# Patient Record
Sex: FEMALE | Race: WHITE | Employment: FULL TIME | URBAN - METROPOLITAN AREA
[De-identification: names, ages, dates, MRNs, and addresses within clinical notes are randomized per-mention and may not be internally consistent; named-entity substitution may affect disease eponyms.]

---

## 2021-01-18 ENCOUNTER — OFFICE VISIT (OUTPATIENT)
Dept: FAMILY MEDICINE CLINIC | Age: 31
End: 2021-01-18
Payer: COMMERCIAL

## 2021-01-18 VITALS
BODY MASS INDEX: 41.32 KG/M2 | HEART RATE: 92 BPM | WEIGHT: 242 LBS | OXYGEN SATURATION: 98 % | TEMPERATURE: 98.2 F | DIASTOLIC BLOOD PRESSURE: 86 MMHG | SYSTOLIC BLOOD PRESSURE: 114 MMHG | HEIGHT: 64 IN

## 2021-01-18 DIAGNOSIS — G44.021 INTRACTABLE CHRONIC CLUSTER HEADACHE: ICD-10-CM

## 2021-01-18 DIAGNOSIS — J45.20 MILD INTERMITTENT ASTHMA WITHOUT COMPLICATION: ICD-10-CM

## 2021-01-18 DIAGNOSIS — Z00.00 ENCOUNTER FOR SCREENING AND PREVENTATIVE CARE: Primary | ICD-10-CM

## 2021-01-18 PROBLEM — G44.029 CHRONIC CLUSTER HEADACHE: Status: ACTIVE | Noted: 2021-01-18

## 2021-01-18 PROCEDURE — 99385 PREV VISIT NEW AGE 18-39: CPT | Performed by: NURSE PRACTITIONER

## 2021-01-18 RX ORDER — VERAPAMIL HYDROCHLORIDE 240 MG/1
480 TABLET, FILM COATED, EXTENDED RELEASE ORAL DAILY
COMMUNITY
Start: 2021-01-09 | End: 2021-03-22 | Stop reason: SDUPTHER

## 2021-01-18 RX ORDER — DESOGESTREL AND ETHINYL ESTRADIOL 7 DAYS X 3
1 KIT ORAL DAILY
COMMUNITY
End: 2021-03-22 | Stop reason: SDUPTHER

## 2021-01-18 SDOH — ECONOMIC STABILITY: TRANSPORTATION INSECURITY
IN THE PAST 12 MONTHS, HAS LACK OF TRANSPORTATION KEPT YOU FROM MEETINGS, WORK, OR FROM GETTING THINGS NEEDED FOR DAILY LIVING?: NO

## 2021-01-18 SDOH — ECONOMIC STABILITY: TRANSPORTATION INSECURITY
IN THE PAST 12 MONTHS, HAS THE LACK OF TRANSPORTATION KEPT YOU FROM MEDICAL APPOINTMENTS OR FROM GETTING MEDICATIONS?: NO

## 2021-01-18 SDOH — ECONOMIC STABILITY: FOOD INSECURITY: WITHIN THE PAST 12 MONTHS, YOU WORRIED THAT YOUR FOOD WOULD RUN OUT BEFORE YOU GOT MONEY TO BUY MORE.: NEVER TRUE

## 2021-01-18 ASSESSMENT — ENCOUNTER SYMPTOMS
ABDOMINAL DISTENTION: 0
RHINORRHEA: 0
VOMITING: 0
DIARRHEA: 0
SHORTNESS OF BREATH: 0
COUGH: 0
ABDOMINAL PAIN: 0
CONSTIPATION: 0
BACK PAIN: 0
CHEST TIGHTNESS: 0
NAUSEA: 0
SORE THROAT: 0

## 2021-01-18 ASSESSMENT — PATIENT HEALTH QUESTIONNAIRE - PHQ9
SUM OF ALL RESPONSES TO PHQ QUESTIONS 1-9: 0
1. LITTLE INTEREST OR PLEASURE IN DOING THINGS: 0
SUM OF ALL RESPONSES TO PHQ QUESTIONS 1-9: 0
SUM OF ALL RESPONSES TO PHQ QUESTIONS 1-9: 0
SUM OF ALL RESPONSES TO PHQ9 QUESTIONS 1 & 2: 0

## 2021-01-18 NOTE — PROGRESS NOTES
Visit Information    Have you changed or started any medications since your last visit including any over-the-counter medicines, vitamins, or herbal medicines? no   Are you having any side effects from any of your medications? -  no  Have you stopped taking any of your medications? Is so, why? -  no    Have you seen any other physician or provider since your last visit? No  Have you had any other diagnostic tests since your last visit? No  Have you been seen in the emergency room and/or had an admission to a hospital since we last saw you? No  Have you had your routine dental cleaning in the past 6 months? no    Have you activated your [employer] account? If not, what are your barriers?  Yes     Patient Care Team:  TRACY Veliz NP as PCP - General (Nurse Practitioner)    Medical History Review  Past Medical, Family, and Social History reviewed and does not contribute to the patient presenting condition    Health Maintenance   Topic Date Due    DTaP/Tdap/Td vaccine (1 - Tdap) 01/31/2010    Cervical cancer screen  01/31/2012    Flu vaccine (1) 01/18/2022 (Originally 9/1/2020)    Hepatitis C screen  01/18/2022 (Originally 1990)    HIV screen  01/18/2022 (Originally 1/31/2006)    Hepatitis A vaccine  Aged Out    Hepatitis B vaccine  Aged Out    Hib vaccine  Aged Out    Meningococcal (ACWY) vaccine  Aged Out    Pneumococcal 0-64 years Vaccine  Aged Out    Varicella vaccine  Discontinued

## 2021-01-18 NOTE — PROGRESS NOTES
Oli Coleman, APRN-CNP  [facility]  [id number] [address] , [address]  [address] [address]  Dept: [phone]  Dept Fax: [phone]    Patient ID: Lisette iNx is a 35 y.o. female. HPI    Lisette Nix is a 35 y.o. female who presents to the office today for a first visit and to establish a relationship with a new primary care physician. Today, the patient complains is in need of a new PCP. She and her sister did just recently move here from [facility] due to a job change. Pt denies any fever or chills. Pt today denies any HA, chest pain, or SOB. Pt denies any N/V/D/C or abdominal pain. She does have a history of asthma, which she states is well controlled and a history of migraines. She has been taking verapamil for years for her migraines and relates that they are stable. The patient's past medical, surgical, social, and family history as well as her current medications and allergies were reviewed as documented in today's encounter by KAUR Bah. Other notes reviewed prior to and during encounter include:  None available      There are no previous labs to review.  Preventative Care thus far include:   o Colonoscopy: N/A  o Mammogram: N/A   o Gynecological: N/A    o Smoking history: Denies  o Alcohol consumption: Denies    o Drug use: Denies      Previous primary care provider: no   Date last evaluated by former primary care provider: last year   Need to request previous records: no     Current Outpatient Medications on File Prior to Visit   Medication Sig Dispense Refill    desogestrel-ethinyl estradiol (VELIVET) 0.1/0.125/0.15 -0.025 MG tablet Take 1 tablet by mouth daily      Multiple Vitamins-Minerals (MULTIVITAMIN ADULT PO) Take by mouth      CALCIUM-MAGNESIUM-VITAMIN D PO Take by mouth       No current facility-administered medications on file prior to visit.         Subjective:     Review of Systems   Constitutional: Negative for activity change, fatigue and fever. HENT: Negative for congestion, ear pain, rhinorrhea and sore throat. Respiratory: Negative for cough, chest tightness and shortness of breath. Cardiovascular: Negative for chest pain and palpitations. Gastrointestinal: Negative for abdominal distention, abdominal pain, constipation, diarrhea, nausea and vomiting. Endocrine: Negative for polydipsia, polyphagia and polyuria. Genitourinary: Negative for difficulty urinating and dysuria. Musculoskeletal: Negative for arthralgias, back pain and myalgias. Skin: Negative for rash. Neurological: Positive for headaches (stable). Negative for dizziness, weakness and light-headedness. Hematological: Negative for adenopathy. Psychiatric/Behavioral: Negative for agitation and behavioral problems. The patient is not nervous/anxious. Objective:     Physical Exam  Vitals signs reviewed. Constitutional:       General: She is not in acute distress. Appearance: Normal appearance. She is well-developed. HENT:      Head: Normocephalic and atraumatic. Right Ear: Hearing and external ear normal.      Left Ear: Hearing and external ear normal.      Nose: Nose normal. No congestion. Right Sinus: No maxillary sinus tenderness or frontal sinus tenderness. Left Sinus: No maxillary sinus tenderness or frontal sinus tenderness. Mouth/Throat:      Lips: Pink. No lesions. Mouth: Mucous membranes are moist. No oral lesions. Tongue: No lesions. Pharynx: Oropharynx is clear. No oropharyngeal exudate or posterior oropharyngeal erythema. Eyes:      Extraocular Movements: Extraocular movements intact. Conjunctiva/sclera: Conjunctivae normal.      Pupils: Pupils are equal, round, and reactive to light. Neck:      Musculoskeletal: Full passive range of motion without pain and normal range of motion. Thyroid: No thyroid mass.    Cardiovascular:      Rate and Rhythm: Normal rate and regular rhythm. Pulses: Normal pulses. Heart sounds: Normal heart sounds. No murmur. Pulmonary:      Effort: Pulmonary effort is normal. No respiratory distress. Breath sounds: Normal breath sounds and air entry. No wheezing, rhonchi or rales. Abdominal:      General: Bowel sounds are normal. There is no distension. Palpations: Abdomen is soft. Tenderness: There is no abdominal tenderness. Musculoskeletal: Normal range of motion. Right lower leg: No edema. Left lower leg: No edema. Lymphadenopathy:      Cervical: No cervical adenopathy. Skin:     General: Skin is warm and dry. Capillary Refill: Capillary refill takes less than 2 seconds. Findings: No rash. Neurological:      General: No focal deficit present. Mental Status: She is alert and oriented to person, place, and time. Deep Tendon Reflexes: Reflexes normal.   Psychiatric:         Attention and Perception: Attention and perception normal.         Mood and Affect: Mood and affect normal.         Speech: Speech normal.         Behavior: Behavior normal. Behavior is cooperative. Cognition and Memory: Memory normal.       Assessment:      Diagnosis Orders   1. Encounter for screening and preventative care     2. Intractable chronic cluster headache     3. Mild intermittent asthma without complication        Plan:     1. Encounter for screening and preventative care  - We did discuss in detail the and the recommended preventative screening guidelines (HTN, lipid, DM, breast, cervical cancer, prostate, colorectal and osteoporosis). - She has never had a PAP  - Recommendations discussed  - Annual mammograms as recommended beginning at the age of 36.     2. Intractable chronic cluster headache  - Stable: Medication re-filled as needed, con't medications as prescribed, con't current tx plan  - Continue with Verapamil as previously prescribed.    - Non-Pharmacological management

## 2021-02-17 ENCOUNTER — PATIENT MESSAGE (OUTPATIENT)
Dept: FAMILY MEDICINE CLINIC | Age: 31
End: 2021-02-17

## 2021-02-18 RX ORDER — HYDROXYZINE HYDROCHLORIDE 10 MG/1
10 TABLET, FILM COATED ORAL 3 TIMES DAILY PRN
Qty: 90 TABLET | Refills: 0 | Status: SHIPPED | OUTPATIENT
Start: 2021-02-18 | End: 2021-03-15

## 2021-03-15 RX ORDER — HYDROXYZINE HYDROCHLORIDE 10 MG/1
10 TABLET, FILM COATED ORAL 3 TIMES DAILY PRN
Qty: 90 TABLET | Refills: 0 | Status: SHIPPED | OUTPATIENT
Start: 2021-03-15 | End: 2021-04-19

## 2021-03-22 ENCOUNTER — PATIENT MESSAGE (OUTPATIENT)
Dept: FAMILY MEDICINE CLINIC | Age: 31
End: 2021-03-22

## 2021-03-22 RX ORDER — VERAPAMIL HYDROCHLORIDE 240 MG/1
480 TABLET, FILM COATED, EXTENDED RELEASE ORAL DAILY
Qty: 180 TABLET | Refills: 1 | Status: SHIPPED | OUTPATIENT
Start: 2021-03-22 | End: 2021-06-08

## 2021-03-22 RX ORDER — DESOGESTREL AND ETHINYL ESTRADIOL 7 DAYS X 3
1 KIT ORAL DAILY
Qty: 90 TABLET | Refills: 3 | Status: SHIPPED | OUTPATIENT
Start: 2021-03-22 | End: 2022-01-12 | Stop reason: SDUPTHER

## 2021-03-22 NOTE — TELEPHONE ENCOUNTER
From: Joselyn Cabral  To: TRACY Troncoso NP  Sent: 3/22/2021 2:26 PM EDT  Subject: Prescription Question    Kaitlyn Us, [facility] put in for a refill on my Velivet (birth control) and Verapamil and I think both were denied. I remember you said that might happen since it's my first refill, so just sending a message! Thank you!

## 2021-04-19 RX ORDER — HYDROXYZINE HYDROCHLORIDE 10 MG/1
10 TABLET, FILM COATED ORAL 3 TIMES DAILY PRN
Qty: 270 TABLET | Refills: 0 | Status: SHIPPED | OUTPATIENT
Start: 2021-04-19 | End: 2021-05-19

## 2021-06-07 ASSESSMENT — ENCOUNTER SYMPTOMS
ABDOMINAL DISTENTION: 0
VOMITING: 0
NAUSEA: 0
BACK PAIN: 0
COUGH: 0
RHINORRHEA: 0
SHORTNESS OF BREATH: 0
SORE THROAT: 0
CONSTIPATION: 0
ABDOMINAL PAIN: 0
DIARRHEA: 0
CHEST TIGHTNESS: 0

## 2021-06-07 NOTE — PROGRESS NOTES
Emeli Evangelista, APRN-CNP  [address] [facility]  [address] [address], [address]  [address] [address]  Dept: [phone]  Dept Fax: [phone]     PATIENT ID: Jessica Guevara is a 28 y.o. female. HPI:  Established pt here today for an acute visit secondary to increasing cluster headache not controlled by by her daily dose of Verapamil. She has been taking 480 mg daily, prescribed by her previous PCP. He has been taking a multivitamin daily that consists of magnesium 500mg/Potassium 99mg. Calcium 500mg. She relates that over the last week she has gotten a headache 6-7 times in the last week which then turned into a full migraine at least 4 of those times. Pt denies any fever or chills. Pt today denies any HA, chest pain, or SOB. Pt denies any N/V/D/C or abdominal pain. Today, patient is doing well on current tx and voices no concerns. My previous office notes, labs and diagnostic studies were reviewed prior to and during encounter. The patient's past medical, surgical, social, and family history as well as current medications and allergies were reviewed as documented in today's encounter by KAUR Lira     Current Outpatient Medications on File Prior to Visit   Medication Sig Dispense Refill    desogestrel-ethinyl estradiol (VELIVET) 0.1/0.125/0.15 -0.025 MG tablet Take 1 tablet by mouth daily 90 tablet 3    Multiple Vitamins-Minerals (MULTIVITAMIN ADULT PO) Take by mouth      CALCIUM-MAGNESIUM-VITAMIN D PO Take by mouth       No current facility-administered medications on file prior to visit. SUBJECTIVE:     Review of Systems   Constitutional: Negative for activity change, fatigue and fever. HENT: Negative for congestion, ear pain, rhinorrhea and sore throat. Respiratory: Negative for cough, chest tightness and shortness of breath. Cardiovascular: Negative for chest pain and palpitations.    Gastrointestinal: Negative for abdominal Normal breath sounds and air entry. No wheezing, rhonchi or rales. Abdominal:      General: Bowel sounds are normal. There is no distension. Palpations: Abdomen is soft. Tenderness: There is no abdominal tenderness. Musculoskeletal:         General: Normal range of motion. Cervical back: Full passive range of motion without pain and normal range of motion. Right lower leg: No edema. Left lower leg: No edema. Lymphadenopathy:      Cervical: No cervical adenopathy. Skin:     General: Skin is warm and dry. Capillary Refill: Capillary refill takes less than 2 seconds. Findings: No rash. Neurological:      General: No focal deficit present. Mental Status: She is alert and oriented to person, place, and time. Deep Tendon Reflexes: Reflexes normal.   Psychiatric:         Attention and Perception: Attention and perception normal.         Mood and Affect: Mood and affect normal.         Speech: Speech normal.         Behavior: Behavior normal. Behavior is cooperative. Cognition and Memory: Memory normal.       ASSESSMENT:   Diagnosis Orders   1. Intractable chronic cluster headache     2. Preventative health care  CBC    Hemoglobin A1C    Lipid Panel    Comprehensive Metabolic Panel    TSH with Reflex    Vitamin D 25 Hydroxy     PLAN:  1. Intractable chronic cluster headache  - Has been on Verapamil 480 mg daily (in the evening) for years  - Will add 240 mg in the morning    2. Preventative health care  - We did discuss in detail the and the recommended preventative screening guidelines (HTN, lipid, DM, breast, cervical cancer, prostate, colorectal and osteoporosis). - Detailed education was provided on the patient's after visit summary.  - Will order above noted labs and discuss them at follow up visit. - CBC; Future  - Hemoglobin A1C; Future  - Lipid Panel; Future  - Comprehensive Metabolic Panel; Future  - TSH with Reflex;  Future  - Vitamin D 25 Hydroxy;

## 2021-06-08 ENCOUNTER — OFFICE VISIT (OUTPATIENT)
Dept: FAMILY MEDICINE CLINIC | Age: 31
End: 2021-06-08
Payer: COMMERCIAL

## 2021-06-08 VITALS
HEIGHT: 64 IN | RESPIRATION RATE: 16 BRPM | SYSTOLIC BLOOD PRESSURE: 118 MMHG | HEART RATE: 69 BPM | BODY MASS INDEX: 40.97 KG/M2 | DIASTOLIC BLOOD PRESSURE: 82 MMHG | WEIGHT: 240 LBS | OXYGEN SATURATION: 98 %

## 2021-06-08 DIAGNOSIS — G44.021 INTRACTABLE CHRONIC CLUSTER HEADACHE: Primary | ICD-10-CM

## 2021-06-08 DIAGNOSIS — Z00.00 PREVENTATIVE HEALTH CARE: ICD-10-CM

## 2021-06-08 PROCEDURE — 99214 OFFICE O/P EST MOD 30 MIN: CPT | Performed by: NURSE PRACTITIONER

## 2021-06-08 RX ORDER — VERAPAMIL HYDROCHLORIDE 240 MG/1
TABLET, FILM COATED, EXTENDED RELEASE ORAL
Qty: 270 TABLET | Refills: 1 | Status: SHIPPED | OUTPATIENT
Start: 2021-06-08 | End: 2021-11-30 | Stop reason: SDUPTHER

## 2021-06-09 ENCOUNTER — HOSPITAL ENCOUNTER (OUTPATIENT)
Age: 31
Setting detail: SPECIMEN
Discharge: HOME | End: 2021-06-09
Payer: COMMERCIAL

## 2021-06-09 DIAGNOSIS — Z00.00 PREVENTATIVE HEALTH CARE: ICD-10-CM

## 2021-06-09 LAB
ALBUMIN SERPL-MCNC: 4 G/DL (ref 3.5–5.2)
ALBUMIN/GLOBULIN RATIO: 1.3 (ref 1–2.5)
ALP BLD-CCNC: 66 U/L (ref 35–104)
ALT SERPL-CCNC: 16 U/L (ref 5–33)
ANION GAP SERPL CALCULATED.3IONS-SCNC: 12 MMOL/L (ref 9–17)
AST SERPL-CCNC: 14 U/L
BILIRUB SERPL-MCNC: 0.32 MG/DL (ref 0.3–1.2)
BUN BLDV-MCNC: 13 MG/DL (ref 6–20)
BUN/CREAT BLD: NORMAL (ref 9–20)
CALCIUM SERPL-MCNC: 8.6 MG/DL (ref 8.6–10.4)
CHLORIDE BLD-SCNC: 104 MMOL/L (ref 98–107)
CHOLESTEROL/HDL RATIO: 3.3
CHOLESTEROL: 156 MG/DL
CO2: 21 MMOL/L (ref 20–31)
CREAT SERPL-MCNC: 0.59 MG/DL (ref 0.5–0.9)
ESTIMATED AVERAGE GLUCOSE: 100 MG/DL
GFR AFRICAN AMERICAN: >60 ML/MIN
GFR NON-AFRICAN AMERICAN: >60 ML/MIN
GFR SERPL CREATININE-BSD FRML MDRD: NORMAL ML/MIN/{1.73_M2}
GFR SERPL CREATININE-BSD FRML MDRD: NORMAL ML/MIN/{1.73_M2}
GLUCOSE BLD-MCNC: 85 MG/DL (ref 70–99)
HBA1C MFR BLD: 5.1 % (ref 4–6)
HCT VFR BLD CALC: 41.7 % (ref 36.3–47.1)
HDLC SERPL-MCNC: 48 MG/DL
HEMOGLOBIN: 12.5 G/DL (ref 11.9–15.1)
LDL CHOLESTEROL: 70 MG/DL (ref 0–130)
MCH RBC QN AUTO: 26.1 PG (ref 25.2–33.5)
MCHC RBC AUTO-ENTMCNC: 30 G/DL (ref 28.4–34.8)
MCV RBC AUTO: 87.1 FL (ref 82.6–102.9)
NRBC AUTOMATED: 0 PER 100 WBC
PDW BLD-RTO: 13.6 % (ref 11.8–14.4)
PLATELET # BLD: 381 K/UL (ref 138–453)
PMV BLD AUTO: 11.7 FL (ref 8.1–13.5)
POTASSIUM SERPL-SCNC: 4.5 MMOL/L (ref 3.7–5.3)
RBC # BLD: 4.79 M/UL (ref 3.95–5.11)
SODIUM BLD-SCNC: 137 MMOL/L (ref 135–144)
TOTAL PROTEIN: 7.1 G/DL (ref 6.4–8.3)
TRIGL SERPL-MCNC: 192 MG/DL
TSH SERPL DL<=0.05 MIU/L-ACNC: 0.77 MIU/L (ref 0.3–5)
VITAMIN D 25-HYDROXY: 23 NG/ML (ref 30–100)
VLDLC SERPL CALC-MCNC: ABNORMAL MG/DL (ref 1–30)
WBC # BLD: 10 K/UL (ref 3.5–11.3)

## 2021-06-10 RX ORDER — ERGOCALCIFEROL 1.25 MG/1
50000 CAPSULE ORAL WEEKLY
Qty: 12 CAPSULE | Refills: 0 | Status: SHIPPED | OUTPATIENT
Start: 2021-06-10

## 2021-08-24 ENCOUNTER — PATIENT MESSAGE (OUTPATIENT)
Dept: FAMILY MEDICINE CLINIC | Age: 31
End: 2021-08-24

## 2021-08-24 RX ORDER — NARATRIPTAN 2.5 MG/1
2.5 TABLET ORAL
Qty: 9 TABLET | Refills: 3 | Status: SHIPPED | OUTPATIENT
Start: 2021-08-24 | End: 2022-01-14 | Stop reason: SDUPTHER

## 2021-08-24 NOTE — TELEPHONE ENCOUNTER
From: Braden Triplett  To: TRACY Rodriges NP  Sent: 8/24/2021 11:16 AM EDT  Subject: Prescription Question    Santos Chiang, can you please send in a refill for my Naratriptan? Thank you!

## 2021-11-29 ENCOUNTER — PATIENT MESSAGE (OUTPATIENT)
Dept: FAMILY MEDICINE CLINIC | Age: 31
End: 2021-11-29

## 2021-11-30 RX ORDER — VERAPAMIL HYDROCHLORIDE 240 MG/1
TABLET, FILM COATED, EXTENDED RELEASE ORAL
Qty: 270 TABLET | Refills: 1 | Status: SHIPPED | OUTPATIENT
Start: 2021-11-30 | End: 2022-02-25

## 2021-11-30 NOTE — TELEPHONE ENCOUNTER
From: Jillian Olmedo  To: Devin López  Sent: 11/29/2021 11:00 PM EST  Subject: Prescription Question    Kolby Drew, can you send an updated prescription to [facility] to take 3 verapamil a day, one in the morning and two at night please? Right now it's in 2 separate prescriptions refilled at different times and they said they can't combine it without a new/updated prescription. Thank you!

## 2022-01-14 RX ORDER — NARATRIPTAN 2.5 MG/1
2.5 TABLET ORAL
Qty: 9 TABLET | Refills: 0 | Status: SHIPPED | OUTPATIENT
Start: 2022-01-14 | End: 2022-01-14

## 2022-02-25 RX ORDER — VERAPAMIL HYDROCHLORIDE 240 MG/1
TABLET, FILM COATED, EXTENDED RELEASE ORAL
Qty: 270 TABLET | Refills: 0 | Status: SHIPPED | OUTPATIENT
Start: 2022-02-25 | End: 2022-05-26

## 2022-05-26 RX ORDER — VERAPAMIL HYDROCHLORIDE 240 MG/1
TABLET, FILM COATED, EXTENDED RELEASE ORAL
Qty: 270 TABLET | Refills: 0 | Status: SHIPPED | OUTPATIENT
Start: 2022-05-26

## 2022-07-19 ENCOUNTER — APPOINTMENT (OUTPATIENT)
Dept: RADIOLOGY | Facility: CLINIC | Age: 32
End: 2022-07-19
Attending: EMERGENCY MEDICINE
Payer: COMMERCIAL

## 2022-07-19 ENCOUNTER — HOSPITAL ENCOUNTER (EMERGENCY)
Facility: CLINIC | Age: 32
Discharge: HOME | End: 2022-07-19
Attending: EMERGENCY MEDICINE | Admitting: EMERGENCY MEDICINE
Payer: COMMERCIAL

## 2022-07-19 VITALS
RESPIRATION RATE: 18 BRPM | HEIGHT: 64 IN | TEMPERATURE: 98.5 F | OXYGEN SATURATION: 100 % | DIASTOLIC BLOOD PRESSURE: 80 MMHG | BODY MASS INDEX: 39.27 KG/M2 | WEIGHT: 230 LBS | SYSTOLIC BLOOD PRESSURE: 174 MMHG | HEART RATE: 89 BPM

## 2022-07-19 DIAGNOSIS — S89.92XA KNEE INJURY, LEFT, INITIAL ENCOUNTER: ICD-10-CM

## 2022-07-19 PROCEDURE — 29505 APPLICATION LONG LEG SPLINT: CPT | Mod: LT

## 2022-07-19 PROCEDURE — 99284 EMERGENCY DEPT VISIT MOD MDM: CPT | Mod: 25

## 2022-07-19 PROCEDURE — 73562 X-RAY EXAM OF KNEE 3: CPT | Mod: LT

## 2022-07-19 PROCEDURE — 250N000013 HC RX MED GY IP 250 OP 250 PS 637: Performed by: EMERGENCY MEDICINE

## 2022-07-19 RX ORDER — OXYCODONE HYDROCHLORIDE 5 MG/1
5 TABLET ORAL ONCE
Status: COMPLETED | OUTPATIENT
Start: 2022-07-19 | End: 2022-07-19

## 2022-07-19 RX ADMIN — OXYCODONE HYDROCHLORIDE 5 MG: 5 TABLET ORAL at 22:07

## 2022-07-19 ASSESSMENT — ENCOUNTER SYMPTOMS
WEAKNESS: 0
NUMBNESS: 0
WOUND: 0
BRUISES/BLEEDS EASILY: 0

## 2022-07-19 NOTE — Clinical Note
Melissa Grimm was seen and treated in our emergency department on 7/19/2022.  She may return to work on 07/21/2022.       If you have any questions or concerns, please don't hesitate to call.      Thanh Kumar MD

## 2022-07-20 NOTE — DISCHARGE INSTRUCTIONS
Recommend icing 20 minutes every hour, recommend knee immobilizer, recommend crutches, recommend ibuprofen alternating with Tylenol.  Follow-up with the orthopedic surgeon in the next week.  Recommend aspirin 1 hour prior to flying.  Recommend return to ER for worsening pain, development of Swelling, development of numbness or weakness or other concern

## 2022-07-20 NOTE — ED PROVIDER NOTES
EMERGENCY DEPARTMENT ENCOUNTER      NAME: Melissa Grimm  AGE: 32 year old female  YOB: 1990  MRN: [id number]  EVALUATION DATE & TIME: 7/19/2022  9:15 PM    PCP: No primary care provider on file.    ED PROVIDER: Valentín Kumar MD        Chief Complaint   Patient presents with     Knee Pain         FINAL IMPRESSION:  1. Knee injury, left, initial encounter          ED COURSE & MEDICAL DECISION MAKING:    Pertinent Labs & Imaging studies reviewed. (See chart for details)  32 year old female presents to the Emergency Department for evaluation of left knee injury while getting up with chair knee twisted.  Did not swell.  History of ACL injury to the same leg not repaired    X-ray negative for fracture.  History and exam not suggestive of compartment syndrome or septic joint.  Possible meniscus injury, possible knee strain, possible ligamentous injury    Patient has crutches.  Knee immobilizer ordered.  Given tablet of oxycodone for pain.     Flying back to [place] on Thursday where she lives.  Recommend take a dose of aspirin prior to flight.    Recommend ice, crutches, knee immobilizer, nonweightbearing, ibuprofen, Tylenol, and return to ER for worsening symptoms otherwise follow-up with orthopedics when return to [place]    10:18 PM I introduced myself to the patient, obtained patient history, performed a physical exam, and discussed plan for ED workup including potential diagnostic laboratory/imaging studies and interventions.           At the conclusion of the encounter I discussed the results of all of the tests and the disposition. The questions were answered. The patient or family acknowledged understanding and was agreeable with the care plan.       MEDICATIONS GIVEN IN THE EMERGENCY:  Medications   oxyCODONE (ROXICODONE) tablet 5 mg (5 mg Oral Given 7/19/22 2207)       NEW PRESCRIPTIONS STARTED AT TODAY'S ER VISIT  New Prescriptions    No medications on file       "    =================================================================    HPI    Triage note  \"Pt reports about 2 hours ago she was getting up from a chair. She twisted to get up and felt left knee pain. Reports she tore her ACL in her teenage years and the pain feels the same.  Denies any surgery to her knee.       \"      Patient information was obtained from: Patient    Use of : N/A       Melissa Grimm is a 32 year old female with no pertinent history who presents to this ED by walk in for evaluation of left knee pain.    Patient was getting up from her chair about 2 hours ago when she hurt her left knee. She did a twisting motion to get up. Patient reports she tore her ACL asa teenager and says hat this pain is similar. She denies having problems to her torn ACL and denies surgery to it.  Patient says she is not stable when walking and is limping.       REVIEW OF SYSTEMS   Review of Systems   Musculoskeletal: Positive for gait problem.        Positive for left knee pain.   Skin: Negative for wound.   Neurological: Negative for weakness and numbness.   Hematological: Does not bruise/bleed easily.       PAST MEDICAL HISTORY:  No past medical history on file.    PAST SURGICAL HISTORY:  No past surgical history on file.        CURRENT MEDICATIONS:    No current outpatient medications on file.      ALLERGIES:  No Known Allergies    FAMILY HISTORY:  No family history on file.    SOCIAL HISTORY:   Social History     Socioeconomic History     Marital status: Single       VITALS:  BP (!) 174/80   Pulse 89   Temp 98.5  F (36.9  C) (Oral)   Resp 18   Ht 1.626 m (5' 4\")   Wt 104.3 kg (230 lb)   SpO2 100%   BMI 39.48 kg/m      PHYSICAL EXAM      Vitals: BP (!) 174/80   Pulse 89   Temp 98.5  F (36.9  C) (Oral)   Resp 18   Ht 1.626 m (5' 4\")   Wt 104.3 kg (230 lb)   SpO2 100%   BMI 39.48 kg/m    General: Appears in no acute distress, awake, alert, interactive.  Eyes: Conjunctivae non-injected. Sclera " anicteric.  HENT: Atraumatic.  Neck: Supple.  Respiratory/Chest: Respiration unlabored.  Abdomen: non distended  Musculoskeletal: Normal extremities. No edema or erythema.  Left knee: Full range of motion.  Negative anterior drawer.  No medial joint line tenderness.  No tenderness over tibial plateau.  Compartments soft.  Pain with Camille's  Skin: Normal color. No rash or diaphoresis.  Neurologic: Face symmetric, moves all extremities spontaneously. Speech clear.  Psychiatric: Oriented to person, place, and time. Affect appropriate.       LAB:  All pertinent labs reviewed and interpreted.  Results for orders placed or performed during the hospital encounter of 07/19/22   XR Knee Left 3 Views    Impression    IMPRESSION: No acute fracture or malalignment. Small chronic ossicle at the medial patella. There is normal joint spacing. No knee joint effusion.       RADIOLOGY:  Reviewed all pertinent imaging. Please see official radiology report.  XR Knee Left 3 Views   Final Result   IMPRESSION: No acute fracture or malalignment. Small chronic ossicle at the medial patella. There is normal joint spacing. No knee joint effusion.            I, Imtiaz Hardwick, am serving as a scribe to document services personally performed by Thanh Kumar MD based on my observation and the provider's statements to me. I, Dr. Thanh Kumar, attest that Imtiaz Hardwick is acting in a scribe capacity, has observed my performance of the services and has documented them in accordance with my direction.    Thanh Kumar MD  Emergency Medicine  [facility] EMERGENCY ROOM  [address] [facility] [address]  [phone]       Thanh Kumar MD  07/19/22 3712

## 2022-07-20 NOTE — ED TRIAGE NOTES
Pt reports about 2 hours ago she was getting up from a chair. She twisted to get up and felt left knee pain. Reports she tore her ACL in her teenage years and the pain feels the same.  Denies any surgery to her knee.

## 2022-09-22 RX ORDER — NARATRIPTAN 2.5 MG/1
2.5 TABLET ORAL
Qty: 9 TABLET | Refills: 0 | OUTPATIENT
Start: 2022-09-22 | End: 2022-09-22

## 2022-09-22 RX ORDER — VERAPAMIL HYDROCHLORIDE 240 MG/1
TABLET, FILM COATED, EXTENDED RELEASE ORAL
Qty: 270 TABLET | Refills: 0 | OUTPATIENT
Start: 2022-09-22

## 2022-10-13 RX ORDER — DESOGESTREL AND ETHINYL ESTRADIOL 7 DAYS X 3
KIT ORAL
Qty: 84 TABLET | OUTPATIENT
Start: 2022-10-13

## 2022-12-29 RX ORDER — VERAPAMIL HYDROCHLORIDE 240 MG/1
TABLET, FILM COATED, EXTENDED RELEASE ORAL
Qty: 270 TABLET | OUTPATIENT
Start: 2022-12-29

## 2022-12-29 RX ORDER — VERAPAMIL HYDROCHLORIDE 240 MG/1
TABLET, FILM COATED, EXTENDED RELEASE ORAL
Qty: 90 TABLET | Refills: 0 | Status: SHIPPED | OUTPATIENT
Start: 2022-12-29

## 2023-01-26 RX ORDER — VERAPAMIL HYDROCHLORIDE 240 MG/1
TABLET, FILM COATED, EXTENDED RELEASE ORAL
Qty: 90 TABLET | Refills: 0 | OUTPATIENT
Start: 2023-01-26